# Patient Record
Sex: MALE | Race: WHITE | NOT HISPANIC OR LATINO | Employment: FULL TIME | URBAN - METROPOLITAN AREA
[De-identification: names, ages, dates, MRNs, and addresses within clinical notes are randomized per-mention and may not be internally consistent; named-entity substitution may affect disease eponyms.]

---

## 2024-11-20 ENCOUNTER — APPOINTMENT (OUTPATIENT)
Dept: URGENT CARE | Facility: CLINIC | Age: 29
End: 2024-11-20

## 2024-11-20 ENCOUNTER — OCCUPATIONAL MEDICINE (OUTPATIENT)
Dept: URGENT CARE | Facility: CLINIC | Age: 29
End: 2024-11-20
Payer: COMMERCIAL

## 2024-11-20 VITALS
WEIGHT: 187.9 LBS | DIASTOLIC BLOOD PRESSURE: 72 MMHG | RESPIRATION RATE: 16 BRPM | TEMPERATURE: 97.9 F | HEART RATE: 96 BPM | BODY MASS INDEX: 28.48 KG/M2 | SYSTOLIC BLOOD PRESSURE: 118 MMHG | OXYGEN SATURATION: 95 % | HEIGHT: 68 IN

## 2024-11-20 DIAGNOSIS — T23.402A: ICD-10-CM

## 2024-11-20 PROCEDURE — 3074F SYST BP LT 130 MM HG: CPT

## 2024-11-20 PROCEDURE — 99203 OFFICE O/P NEW LOW 30 MIN: CPT | Mod: 25

## 2024-11-20 PROCEDURE — 90714 TD VACC NO PRESV 7 YRS+ IM: CPT | Mod: JZ

## 2024-11-20 PROCEDURE — 90471 IMMUNIZATION ADMIN: CPT

## 2024-11-20 PROCEDURE — 3078F DIAST BP <80 MM HG: CPT

## 2024-11-20 RX ORDER — CEPHALEXIN 500 MG/1
500 CAPSULE ORAL 3 TIMES DAILY
Qty: 15 CAPSULE | Refills: 0 | Status: SHIPPED | OUTPATIENT
Start: 2024-11-20 | End: 2024-11-25

## 2024-11-20 NOTE — PROGRESS NOTES
"Verbal consent was acquired by the patient to use [employer] ambient listening note generation during this visit   Subjective:   Rafael Osborne is a 29 y.o. male who presents for Other (X today got propane sprayed on left hand. )      HPI:  History of Present Illness  The patient presents today for a work-related injury   DOI: 11/20/2024  IRA:While replacing an empty propane tank on a forklift, he was exposed to liquid propane due to an open valve. The liquid propane sprayed over his left hand, causing immediate freezing as it soaked into his glove. He managed to wash his hands thoroughly afterwards. He reports experiencing some pain, but it is not severe. His tetanus vaccination is not current.        Review of Systems   Musculoskeletal:         Left hand pain       Problem list, medications, and allergies reviewed by myself today in Epic.     Objective:     /72   Pulse 96   Temp 36.6 °C (97.9 °F) (Temporal)   Resp 16   Ht 1.727 m (5' 8\")   Wt 85.2 kg (187 lb 14.4 oz)   SpO2 95%   BMI 28.57 kg/m²     Physical Exam  Left hand: 1 inch erythematous area to dorsal aspect of hand between 1st-and 2nd metacarpals, area is non draining, +TTP,  5/5  strength, brisk cap refill.     Assessment/Plan:     Diagnosis and associated orders:   1. Chemical burn of left hand, unspecified corrosion degree, initial encounter  - cephALEXin (KEFLEX) 500 MG Cap; Take 1 Capsule by mouth 3 times a day for 5 days.  Dispense: 15 Capsule; Refill: 0  - TD Preservative Free =>6yo IM        Comments/MDM:   Pt is clinically stable at today's acute urgent care visit.  No acute distress noted. Appropriate for outpatient management at this time.     Assessment & Plan    He maintains full range of motion and  strength despite the injury. A tetanus vaccine will be administered today. An oral antibiotic course will be initiated. Full duty, provided he wears gloves for protection. Tylenol and ibuprofen for " pain    Follow-up  Return in 4 days for follow up.            Discussed DDx, management options (risks,benefits, and alternatives to planned treatment), natural progression and supportive care.  Expressed understanding and the treatment plan was agreed upon. Questions were encouraged and answered   Return to urgent care prn if new or worsening sx or if there is no improvement in condition prn.    Educated in Red flags and indications to immediately call 911 or present to the Emergency Department.   Advised the patient to follow-up with the primary care physician for recheck, reevaluation, and consideration of further management.    I personally reviewed prior external notes and test results pertinent to today's visit.  I have independently reviewed and interpreted all diagnostics ordered during this urgent care acute visit.       Please note that this dictation was created using voice recognition software. I have made a reasonable attempt to correct obvious errors, but I expect that there are errors of grammar and possibly content that I did not discover before finalizing the note.    This note was electronically signed by MELISA Kirkpatrick

## 2024-11-20 NOTE — LETTER
"    EMPLOYEE’S CLAIM FOR COMPENSATION/ REPORT OF INITIAL TREATMENT  FORM C-4  PLEASE TYPE OR PRINT    EMPLOYEE’S CLAIM - PROVIDE ALL INFORMATION REQUESTED   First Name                    JACKLYN Hall                  Last Name  Dylon Birthdate                    1995                Sex  Male Claim Number (Insurer’s Use Only)     Home Address  [address] Age  29 y.o. Height  1.727 m (5' 8\") Weight  85.2 kg (187 lb 14.4 oz) Social Security Number     [facility] Zip  [address] Telephone  There are no phone numbers on file.   Mailing Address  [address] [facility] Zip  [address] Primary Language Spoken  English    INSURER   THIRD-PARTY   Faye White   Employee's Occupation (Job Title) When Injury or Occupational Disease Occurred      Employer's Name/Company Name  [employer]  Telephone  [phone]    Office Mail Address (Number and Street)  [address] [place] [address]     Date of Injury (if applicable) 11/20/2024               Hours Injury (if applicable)  1:55 PM Date Employer Notified  11/20/2024 Last Day of Work after Injury or Occupational Disease  11/20/2024 Supervisor to Whom Injury Reported  Josh Soliz   Address or Location of Accident (if applicable)  Work [1]   What were you doing at the time of accident? (if applicable)  Changing propane tank on forklift.    How did this injury or occupational disease occur? (Be specific and answer in detail. Use additional sheet if necessary)  Was changing the empty tank for a full one but valve was left open by propane company and caused liquid propane to spray onto hands.   If you believe that you have an occupational disease, when did you first have knowledge of the disability and its relationship to your employment?  N/a Witnesses to the Accident (if applicable)  Josh Soliz      Nature of Injury " or Occupational Disease  Freezing  Part(s) of Body Injured or Affected  Hand (L) N/A N/A    I CERTIFY THAT THE ABOVE IS TRUE AND CORRECT TO T HE BEST OF MY KNOWLEDGE AND THAT I HAVE PROVIDED THIS INFORMATION IN ORDER TO OBTAIN THE BENEFITS OF [place]CC[place], OR [place]).  I HEREBY AUTHORIZE ANY PHYSICIAN, CHIROPRACTOR, SURGEON, PRACTITIONER OR ANY OTHER PERSON, ANY HOSPITAL, INCLUDING [facility] OR [facility], ANY  MEDICAL SERVICE ORGANIZATION, ANY INSURANCE COMPANY, OR OTHER INSTITUTION OR ORGANIZATION TO RELEASE TO EACH OTHER, ANY MEDICAL OR OTHER INFORMATION, INCLUDING BENEFITS PAID OR PAYABLE, PERTINENT TO THIS INJURY OR DISEASE, EXCEPT INFORMATION RELATIVE TO DIAGNOSIS, TREATMENT AND/OR COUNSELING FOR AIDS, PSYCHOLOGICAL CONDITIONS, ALCOHOL OR CONTROLLED SUBSTANCES, FOR WHICH I MUST GIVE SPECIFIC AUTHORIZATION.  A PHOTOSTAT OF THIS AUTHORIZATION SHALL BE VALID AS THE ORIGINAL.     Date 11/20/2024   [facility] Employee’s Original or  *Electronic Signature   THIS REPORT MUST BE COMPLETED AND MAILED WITHIN 3 WORKING DAYS OF TREATMENT   [facility]    Name of [facility]   Date 11/20/2024 Diagnosis and Description of Injury or Occupational Disease  (T23.402A) Chemical burn of left hand, unspecified corrosion degree, initial encounter  The encounter diagnosis was Chemical burn of left hand, unspecified corrosion degree, initial encounter. Is there evidence that the injured employee was under the influence of alcohol and/or another controlled substance at the time of accident?  []No  [] Yes (if yes, please explain)   Hour 2:58 PM  No   Treatment: A tetanus vaccine will be administered today. An oral antibiotic course will be initiated. Full duty, provided he wears gloves for protection. Tylenol and ibuprofen for pain      Have you advised the patient to remain off work five days  or more?   [] Yes Indicate dates: From   To    [] No      If no, is the injured employee capable of: [] full duty [] modified duty                     If modified duty, specify any limitations / restrictions:                                                                                                                                                                                                                                                                                                                                                                                                                  X-Ray Findings:      From information given by the employee, together with medical evidence, can you directly connect this injury or occupational disease as job incurred?  []Yes   [] No Yes    Is additional medical care by a physician indicated? []Yes [] No  Yes    Do you know of any previous injury or disease contributing to this condition or occupational disease? []Yes [] No (Explain if yes)                          No   Date  11/20/2024 Print Health Care Provider’s Name  ELISE Gee I certify that the employer’s copy of  this form was delivered to the employer on:   Address  [address] [place] [address] INSURER'S USE ONLY                       [facility]  [id number] Provider’s Tax ID Number  [id number]   Telephone  Dept: [phone]    Health Care Provider’s Original or Electronic Signature  e-SignCARTERRICH Degree (MD,DO, DC,PA-C,APRN)  APRN  Choose (if applicable)      ORIGINAL - TREATING HEALTHCARE PROVIDER PAGE 2 - INSURER/TPA PAGE 3 - EMPLOYER PAGE 4 - EMPLOYEE             Form C-4 (rev.08/23)

## 2024-11-20 NOTE — LETTER
PHYSICIAN’S AND CHIROPRACTIC PHYSICIAN'S   PROGRESS REPORT   CERTIFICATION OF DISABILITY Claim Number:     Social Security Number:    Patient’s Name: Rafael Osborne Date of Injury: 11/20/2024   Employer: [employer] Name of [employer] (if applicable):      Patient’s Job Description/Occupation:        Previous Injuries/Diseases/Surgeries Contributing to the Condition:         Diagnosis: (T23.402A) Chemical burn of left hand, unspecified corrosion degree, initial encounter      Related to the Industrial Injury? Yes     Explain: The patient presents today for a work-related injury   DOI: 11/20/2024  IRA:While replacing an empty propane tank on a forklift, he was exposed to liquid propane due to an open valve. The liquid propane sprayed over his left hand, causing immediate freezing as it soaked into his glove. He managed to wash his hands thoroughly afterwards. He reports experiencing some pain, but it is not severe. His tetanus vaccination is not current.          Review of Systems   Musculoskeletal:         Left hand pain         Objective Medical Findings: Physical Exam:Left hand: 1 inch erythematous area to dorsal aspect of hand between 1st-and 2nd metacarpals, area is non draining, +TTP,  5/5  strength, brisk cap refill.            None - Discharged                         Stable  Yes                 Ratable  No        Generally Improved                         Condition Worsened                  Condition Same  May Have Suffered a Permanent Disability No     Treatment Plan:              No Change in Therapy                  PT/OT Prescribed                      Medication May be Used While Working        Case Management                          PT/OT Discontinued    Consultation    Further Diagnostic Studies:    Prescription(s)               X  Released to FULL DUTY /No Restrictions on (Date):  11/20/2024    Certified TOTALLY TEMPORARILY DISABLED (Indicate Dates) From:   To:      Released  to RESTRICTED/Modified Duty on (Date): From:   To:    Restrictions Are:         No Sitting    No Standing    No Pulling Other:  Full duty, provided he wears gloves for protection.        No Bending at Waist     No Stooping     No Lifting        No Carrying     No Walking Lifting Restricted to (lbs.):          No Pushing        No Climbing     No Reaching Above Shoulders       Date of Next Visit:  11/24/2024 Date of this Exam: 11/20/2024 Physician/Chiropractic Physician Name: ELISE Gee Physician/Chiropractic Physician Signature:  Gianluca Trevino DO MPH     [place]:  [address] [place] [address], [address] [place], [place] [address] - Telephone [phone] [place]:  [address] [place] [address], [address] [place], [place] [address] - Telephone [phone]    [email or web address]  D-39 (Rev. 10/24)

## 2024-11-22 ENCOUNTER — OCCUPATIONAL MEDICINE (OUTPATIENT)
Dept: OCCUPATIONAL MEDICINE | Facility: CLINIC | Age: 29
End: 2024-11-22
Payer: COMMERCIAL

## 2024-11-22 VITALS
SYSTOLIC BLOOD PRESSURE: 100 MMHG | TEMPERATURE: 97.4 F | WEIGHT: 184 LBS | RESPIRATION RATE: 16 BRPM | DIASTOLIC BLOOD PRESSURE: 70 MMHG | HEIGHT: 68 IN | HEART RATE: 84 BPM | BODY MASS INDEX: 27.89 KG/M2

## 2024-11-22 DIAGNOSIS — T23.462D: ICD-10-CM

## 2024-11-22 PROCEDURE — 99213 OFFICE O/P EST LOW 20 MIN: CPT | Performed by: NURSE PRACTITIONER

## 2024-11-22 ASSESSMENT — PAIN SCALES - GENERAL: PAINLEVEL_OUTOF10: 1=MINIMAL PAIN

## 2024-11-22 NOTE — LETTER
PHYSICIAN’S AND CHIROPRACTIC PHYSICIAN'S   PROGRESS REPORT   CERTIFICATION OF DISABILITY Claim Number:     Social Security Number:    Patient’s Name: Rafael Osborne Date of Injury: 11/20/2024   Employer: [employer] Name of [employer] (if applicable):      Patient’s Job Description/Occupation:        Previous Injuries/Diseases/Surgeries Contributing to the Condition:         Diagnosis: (T23.462D) Chemical burn of back of left hand, unspecified corrosion degree, subsequent encounter      Related to the Industrial Injury? Yes     Explain: DOI: 11/20/2024  IRA:While replacing an empty propane tank on a forklift, he was exposed to liquid propane due to an open valve. The liquid propane sprayed over his left hand, causing immediate freezing as it soaked into his glove.      Objective Medical Findings: Left hand: 1 inch erythematous area to dorsal aspect of hand between 1st-and 2nd metacarpals, area is non draining, mild TTP,  5/5  strength, brisk cap refill.  Subjective decreased sensation to the area otherwise distal neurovascular sensation and strength intact.         None - Discharged                         Stable  Yes                 Ratable  No     X   Generally Improved                         Condition Worsened               X   Condition Same  May Have Suffered a Permanent Disability No     Treatment Plan:    Follow up in 2 weeks  Wear gloves while at work  Continue to complete antibiotics as prescribed  Continue to monitor the area for signs and symptoms of infection  With new or worsening symptoms go to the ER for further evaluation and management         No Change in Therapy                  PT/OT Prescribed                      Medication May be Used While Working        Case Management                          PT/OT Discontinued    Consultation    Further Diagnostic Studies:    Prescription(s)           Complete cephALEXin (KEFLEX) 500 MG Cap; Take 1 Capsule by mouth 3 times a day  for 5 days as prescribed   X  Released to FULL DUTY /No Restrictions on (Date):       Certified TOTALLY TEMPORARILY DISABLED (Indicate Dates) From:   To:      Released to RESTRICTED/Modified Duty on (Date): From:   To:    Restrictions Are:         No Sitting    No Standing    No Pulling Other:         No Bending at Waist     No Stooping     No Lifting        No Carrying     No Walking Lifting Restricted to (lbs.):          No Pushing        No Climbing     No Reaching Above Shoulders       Date of Next Visit:  Friday 12/6/2024  @ 4:00 PM  Date of this Exam: 11/22/2024 Physician/Chiropractic Physician Name: ELISE Kuhn Physician/Chiropractic Physician Signature:  Gianluca Trevino DO MPH     [place]:  [address] [place] [address], [address] [place], [place] [address] - Telephone [phone] [place]:  [address] [place] [address], [address] [place], [place] [address] - Telephone [phone]    [email or web address]  D-39 (Rev. 10/24)

## 2024-11-23 NOTE — PROGRESS NOTES
"Subjective:     Rafael Osborne is a 29 y.o. male who presents for Follow-Up ((DOI 11/20/24 L. HAND/SAME) RM 17)    DOI: 11/20/2024. IRA:While replacing an empty propane tank on a forklift, he was exposed to liquid propane due to an open valve. The liquid propane sprayed over his left hand, causing immediate freezing as it soaked into his glove. He managed to wash his hands thoroughly afterwards.       Patient states that symptoms are unchanged, but not worse.  He has continued to experience some very mild some pain, but it is not severe.  He states is primarily just tender and he cannot feel much in the area where the burn is but everything else around the burn has full sensation.  Denies signs and symptoms of infection.  He has no weak .  His tetanus vaccination updated at the urgent care.  He states he has taken 2 days of the keflex without difficulty so far.  He is currently tolerating full duty with minimal difficulty.  Plan of care discussed with patient.    ROS: All systems were reviewed on intake form, form was reviewed and signed. See scanned documents in media. Pertinent positives and negatives included in HPI.    PMH: No pertinent past medical history to this problem  MEDS: Medications were reviewed in Epic  ALLERGIES: No Known Allergies  SOCHX: Works as  at [employer]  FH: No pertinent family history to this problem       Objective:     /70 (BP Location: Right arm, Patient Position: Sitting, BP Cuff Size: Adult)   Pulse 84   Temp 36.3 °C (97.4 °F)   Resp 16   Ht 1.727 m (5' 8\")   Wt 83.5 kg (184 lb)   BMI 27.98 kg/m²     [unfilled]    Left hand: 1 inch erythematous area to dorsal aspect of hand between 1st-and 2nd metacarpals, area is non draining, mild TTP,  5/5  strength, brisk cap refill.  Subjective decreased sensation to the area otherwise distal neurovascular sensation and strength intact.    Assessment/Plan:       1. Chemical burn of back of left " hand, unspecified corrosion degree, subsequent encounter           Differential diagnosis, natural history, supportive care, and indications for immediate follow-up discussed.    Approximately 30 minutes were spent in reviewing notes, preparing for visit, obtaining history, exam and evaluation, patient counseling/education and post visit documentation/orders.

## 2024-12-06 ENCOUNTER — OCCUPATIONAL MEDICINE (OUTPATIENT)
Dept: OCCUPATIONAL MEDICINE | Facility: CLINIC | Age: 29
End: 2024-12-06
Payer: COMMERCIAL

## 2024-12-06 VITALS
TEMPERATURE: 97.7 F | SYSTOLIC BLOOD PRESSURE: 126 MMHG | OXYGEN SATURATION: 94 % | RESPIRATION RATE: 14 BRPM | DIASTOLIC BLOOD PRESSURE: 84 MMHG | BODY MASS INDEX: 30.05 KG/M2 | HEIGHT: 66 IN | WEIGHT: 187 LBS | HEART RATE: 86 BPM

## 2024-12-06 DIAGNOSIS — T23.462D: ICD-10-CM

## 2024-12-06 PROCEDURE — 3074F SYST BP LT 130 MM HG: CPT | Performed by: NURSE PRACTITIONER

## 2024-12-06 PROCEDURE — 3079F DIAST BP 80-89 MM HG: CPT | Performed by: NURSE PRACTITIONER

## 2024-12-06 PROCEDURE — 1125F AMNT PAIN NOTED PAIN PRSNT: CPT | Performed by: NURSE PRACTITIONER

## 2024-12-06 PROCEDURE — 99213 OFFICE O/P EST LOW 20 MIN: CPT | Performed by: NURSE PRACTITIONER

## 2024-12-06 ASSESSMENT — ENCOUNTER SYMPTOMS
RESPIRATORY NEGATIVE: 1
MYALGIAS: 0
WEAKNESS: 0
CARDIOVASCULAR NEGATIVE: 1
PSYCHIATRIC NEGATIVE: 1
CONSTITUTIONAL NEGATIVE: 1
MUSCULOSKELETAL NEGATIVE: 1
SENSORY CHANGE: 0
TINGLING: 0

## 2024-12-06 ASSESSMENT — PAIN SCALES - GENERAL: PAINLEVEL_OUTOF10: 10=SEVERE PAIN

## 2024-12-06 NOTE — LETTER
PHYSICIAN’S AND CHIROPRACTIC PHYSICIAN'S   PROGRESS REPORT   CERTIFICATION OF DISABILITY Claim Number:     Social Security Number:    Patient’s Name: Rafael Osborne Date of Injury: 11/20/2024   Employer: [employer] Name of [employer] (if applicable):      Patient’s Job Description/Occupation:        Previous Injuries/Diseases/Surgeries Contributing to the Condition:         Diagnosis: (T23.462D) Chemical burn of back of left hand, unspecified corrosion degree, subsequent encounter      Related to the Industrial Injury? Yes     Explain: DOI: 11/20/2024. IRA:While replacing an empty propane tank on a forklift, he was exposed to liquid propane due to an open valve. The liquid propane sprayed over his left hand, causing immediate freezing as it soaked into his glove. He managed to wash his hands thoroughly afterwards.       Objective Medical Findings: Left hand: Healed 1 inch erythematous area to dorsal aspect of hand between 1st-and 2nd metacarpals, area is non draining, no TTP,  5/5  strength, brisk cap refill.  Sensation to the area is returned.  Distal neurovascular sensation and strength intact.      X   None - Discharged                         Stable  Yes                 Ratable  No     X   Generally Improved                         Condition Worsened                  Condition Same  May Have Suffered a Permanent Disability No     Treatment Plan:    Discharged/MMI  Released to full duty  Follow-up as needed         No Change in Therapy                  PT/OT Prescribed                      Medication May be Used While Working        Case Management                          PT/OT Discontinued    Consultation    Further Diagnostic Studies:    Prescription(s)               X  Released to FULL DUTY /No Restrictions on (Date):  12/6/2024    Certified TOTALLY TEMPORARILY DISABLED (Indicate Dates) From:   To:      Released to RESTRICTED/Modified Duty on (Date): From:   To:    Restrictions Are:          No Sitting    No Standing    No Pulling Other:         No Bending at Waist     No Stooping     No Lifting        No Carrying     No Walking Lifting Restricted to (lbs.):          No Pushing        No Climbing     No Reaching Above Shoulders       Date of Next Visit:   Discharged/MMI  Released to full duty  Follow-up as needed Date of this Exam: 12/6/2024 Physician/Chiropractic Physician Name: ELISE Kuhn Physician/Chiropractic Physician Signature:  Gianluca Trevino DO MPH     [place]:  [address] [place] [address], [address] [place], [place] [address] - Telephone [phone] [place]:  [address] [place] [address], [address] [place], [place] [address] - Telephone [phone]    [email or web address]  D-39 (Rev. 10/24)

## 2024-12-07 NOTE — PROGRESS NOTES
"Subjective:     Rafael Osborne is a 29 y.o. male who presents for Follow-Up ([facility] exam room 16/)    DOI: 11/20/2024. IRA:While replacing an empty propane tank on a forklift, he was exposed to liquid propane due to an open valve. The liquid propane sprayed over his left hand, causing immediate freezing as it soaked into his glove. He managed to wash his hands thoroughly afterwards.         Patient states that symptoms are improved.  He has continued to experience some very mild some pain.  He states is primarily just tender.  The he cannot feel much in the area where the burn is but everything else around the burn has full sensation.  Denies signs and symptoms of infection.  He has no weak .  His tetanus vaccination updated at the urgent care.  He completed the keflex without difficulty.  He is currently tolerating full duty with minimal difficulty.  He will be released from care at this time.  Plan of care discussed with patient.       Review of Systems   Constitutional: Negative.    Respiratory: Negative.     Cardiovascular: Negative.    Musculoskeletal: Negative.  Negative for myalgias.   Skin:         Healed 1 inch erythematous area to the dorsal aspect of the hand between the first and second metatarsals.   Neurological:  Negative for tingling, sensory change and weakness.   Psychiatric/Behavioral: Negative.         SOCHX: Works as  at [employer]  FH: No pertinent family history to this problem       Objective:     /84   Pulse 86   Temp 36.5 °C (97.7 °F) (Temporal)   Resp 14   Ht 1.676 m (5' 6\")   Wt 84.8 kg (187 lb)   SpO2 94%   BMI 30.18 kg/m²     Constitutional: Patient is in no acute distress. Appears well-developed and well-nourished.   Cardiovascular: Normal rate.    Pulmonary/Chest: Effort normal. No respiratory distress.   Neurological: Patient is alert and oriented to person, place, and time.   Skin: Skin is warm and dry.   Psychiatric: Normal mood and " affect. Behavior is normal.     Left hand: Healed 1 inch erythematous area to dorsal aspect of hand between 1st-and 2nd metacarpals, area is non draining, no TTP,  5/5  strength, brisk cap refill.  Sensation to the area is returned.  Distal neurovascular sensation and strength intact.    Assessment/Plan:       1. Chemical burn of back of left hand, unspecified corrosion degree, subsequent encounter    Discharged/MMI  Released to full duty  Follow-up as needed    Differential diagnosis, natural history, supportive care, and indications for immediate follow-up discussed.    Approximately 25 minutes was spent in preparing for visit, obtaining history, exam and evaluation, patient counseling/education and post visit documentation/orders.